# Patient Record
Sex: FEMALE
[De-identification: names, ages, dates, MRNs, and addresses within clinical notes are randomized per-mention and may not be internally consistent; named-entity substitution may affect disease eponyms.]

---

## 2018-12-21 ENCOUNTER — HOSPITAL ENCOUNTER (EMERGENCY)
Dept: HOSPITAL 80 - CED | Age: 23
Discharge: HOME | End: 2018-12-21
Payer: MEDICAID

## 2018-12-21 VITALS — DIASTOLIC BLOOD PRESSURE: 62 MMHG | SYSTOLIC BLOOD PRESSURE: 105 MMHG

## 2018-12-21 DIAGNOSIS — E86.0: ICD-10-CM

## 2018-12-21 DIAGNOSIS — A08.4: ICD-10-CM

## 2018-12-21 DIAGNOSIS — Z32.01: Primary | ICD-10-CM

## 2018-12-21 NOTE — EDPHY
H & P


Stated Complaint: PT. states vomiting start aprox 0700 this am with diarrhea,

chills


Time Seen by Provider: 12/21/18 11:16


HPI/ROS: 





This patient describes multiple episodes of vomiting and loose watery diarrhea 

that started around 7 continue toe shortly prior to arrival with ongoing 

nausea.  A co-worker drove her in for evaluation.  She reports some 

lightheadedness while standing prior to arrival here.  She reported bright red 

streaks of blood in her vomit the last time she vomited.  She notes no blood in 

her stool.  No dark tarry stools or coffee-ground emesis.  She reports 

antecedent nasal congestion for 3 days prior to the onset of her GI symptoms.  

She notes no exacerbating factors for her vomiting diarrhea and she reports 

associated generalized abdominal cramping that is 8/10 intensity again with no 

exacerbating factors.





ROS:  Constitutional:  No fevers.  No other constitutional symptoms


HEENT:  Nasal congestion without sinus pain.


Pulmonary:  She had cough that resolved over the past week or so.  No current 

coughing or shortness of breath.


Cardiovascular:  Orthostatics symptoms as described.  No chest pain.\


GI:  No abdominal distension.  No focal area of pain in her belly.


:  No dysuria frequency urgency.  Last menstrual period was 5 weeks ago which 

is not uncommon for her.


No vaginal bleeding.  No vaginal discharge.


Integumentary:  No rash


Neuro:  No complaints


10 point review of symptoms is performed and otherwise negative with exception 

of pertinent positives and negatives listed in HPI and ROS





Source: Patient


Exam Limitations: No limitations





- Personal History


LMP (Females 10-55): Extended Cycle BCP/Inj


Current Tetanus Diphtheria and Acellular Pertussis (TDAP): Yes


Tetanus Vaccine Date: 2015





- Medical/Surgical History


Hx Asthma: No


Hx Chronic Respiratory Disease: No


Hx Diabetes: No


Hx Cardiac Disease: No


Hx Renal Disease: No


Hx Cirrhosis: No


Hx Alcoholism: No


Hx HIV/AIDS: No


Hx Splenectomy or Spleen Trauma: No


Other PMH: MEd hx-none.  Surg-none





- Social History


Smoking Status: Never smoked


Alcohol Use: None


Drug Use: Marijuana (Occasional marijuana use)





- Physical Exam


Exam: 





General Appearance:  Pleasant  female Alert, no distress.


Eyes:  Pupils equal and round no pallor or injection.


ENT, Mouth:  Mucous membranes moist.


Respiratory:  There are no retractions, lungs are clear to auscultation.


Cardiovascular:  Regular rate and rhythm.  No murmur gallop or rub


Gastrointestinal:  Hypoactive bowel sounds with diffuse moderate tenderness 

without guarding or rebound.


Neurological:  GCS 15. 


Skin:  Warm and dry, no rashes.


Musculoskeletal:  Neck is supple nontender.


Extremities are symmetrical, full range of motion.


Psychiatric:  Mood and affect are normal





DIFFERENTIAL DIAGNOSIS:  After history and physical exam differential diagnosis 

was considered for viral gastroenteritis with associated cramping, dehydration 

couple appendicitis, UTI, ectopic pregnancy


Constitutional: 


 Initial Vital Signs











Temperature (C)  36.4 C   12/21/18 11:16


 


Heart Rate  85   12/21/18 11:16


 


Respiratory Rate  16   12/21/18 11:16


 


Blood Pressure  107/56 L  12/21/18 11:16


 


O2 Sat (%)  100   12/21/18 11:16








 











O2 Delivery Mode               Room Air














Allergies/Adverse Reactions: 


 





No Known Allergies Allergy (Verified 12/21/18 11:16)


 








Home Medications: 














 Medication  Instructions  Recorded


 


Ondansetron Odt [Zofran Odt] 4 - 8 mg PO Q4PRN PRN #4 tab 12/21/18














Medical Decision Making





- Diagnostics


Imaging Results: 


Her pelvic ultrasound results of her positive pregnancy urine dip test showed 

what appears to be an early gestational sac within the uterus per Radiology.  

She has no ovarian masses or other findings that would suggest ectopic 

pregnancy.  I discussed this with the radiologist


Imaging: Discussed imaging studies w/ On call Radiologist


ED Course/Re-evaluation: 





IV normal saline bolus





Benadryl and Reglan for nausea, vomiting and abdominal cramping





Discussion:  Patient presents with findings consistent with viral 

gastroenteritis-URI symptoms accompanied by vomiting and diarrhea.  Her last 

menstrual period was 5 weeks ago drinking pregnancy test is positive for early 

intrauterine pregnancy.  She is well-hydrated after 2 L normal saline and 

nausea control with Zofran.  I counseled her regarding her pregnancy which she 

wishes to keep.  Her significant other-boyfriend is also present at the bedside 

and they seem happy about the news of pregnancy.  She has no OBGYN physician 

also provided our gyn on-call for close follow-up.  She understands need to 

return emergency department should she have significant recurrence of symptoms 

despite plan of light diet and hydration.





- Data Points


Laboratory Results: 


CBC basic metabolic panel are normal.





Urinalysis is normal





Urine pregnancy is positive.


Medications Given: 


 








Discontinued Medications





Diphenhydramine HCl (Benadryl Injection)  25 mg IVP EDNOW ONE


   Stop: 12/21/18 11:30


   Last Admin: 12/21/18 11:39 Dose:  25 mg


Hyoscyamine Sulfate (Levsin, Hyomax-Sl)  0.125 mg PO EDNOW ONE


   Stop: 12/21/18 11:57


   Last Admin: 12/21/18 11:58 Dose:  0.125 mg


Hyoscyamine Sulfate (Levsin, Hyomax-Sl)  0.125 mg PO EDNOW ONE


   Stop: 12/21/18 13:30


   Last Admin: 12/21/18 13:40 Dose:  0.125 mg


Sodium Chloride (Ns)  1,000 mls @ 0 mls/hr IV ONCE ONE


   PRN Reason: Wide Open


   Stop: 12/21/18 11:30


   Last Admin: 12/21/18 11:38 Dose:  1,000 mls


Sodium Chloride (Ns)  1,000 mls @ 0 mls/hr IV ONCE ONE


   PRN Reason: Wide Open


   Stop: 12/21/18 12:21


   Last Admin: 12/21/18 12:24 Dose:  1,000 mls


Ketorolac Tromethamine (Toradol)  15 mg IVP EDNOW ONE


   Stop: 12/21/18 11:57


   Last Admin: 12/21/18 12:02 Dose:  15 mg


Metoclopramide HCl (Reglan Injection)  10 mg IVP EDNOW ONE


   Stop: 12/21/18 11:30


   Last Admin: 12/21/18 11:48 Dose:  10 mg


Ondansetron HCl (Zofran)  4 mg IVP EDNOW ONE


   Stop: 12/21/18 15:15


   Last Admin: 12/21/18 15:27 Dose:  4 mg





Point of Care Test Results: 


 CBC











CBC Collection Date            12/21/18


 


CBC Collection Time            11:30


 


WBC                            9.6


 


RBC                            4.78


 


HGB                            14.4


 


HCT                            41.4


 


PLT                            325


 


Neut #                         8.8


 


Neut                           91.8


 


LYMPH #                        0.5


 


LYMPH                          4.7


 


Other WBC #                    0.3


 


Other WBC                      3.5


 


MCV                            86.6











 Chemistry











  12/21/18 12/21/18





  11:59 11:37


 


POC Sodium  139 mEq/L mEq/L  140 mEq/L mEq/L





   (135-145)   (135-145) 


 


POC Potassium  3.7 mEq/L mEq/L  2.8 mEq/L L mEq/L





   (3.3-5.0)   (3.3-5.0) 


 


POC Chloride  105 mEq/L mEq/L  102.0 mEq/L mEq/L





   ()   () 


 


POC Total CO2    21 mEq/L L mEq/L





    (22-31) 


 


POC BUN  10 mg/dL mg/dL  10 mg/dL mg/dL





   (7-23)   (7-23) 


 


POC Creatinine  0.4 mg/dL L mg/dL  0.6 mg/dL mg/dL





   (0.6-1.0)   (0.6-1.0) 


 


POC Glucose  84 mg/dL mg/dL  87 mg/dL mg/dL





   ()   () 


 


POC Calcium    9.2 mg/dL mg/dL





    (8.5-10.4) 








 ISTAT H&H











  12/21/18





  11:59


 


POC Hgb  15.3 gm/dL gm/dL





   (12.6-16.3) 


 


POC Hct  45 % %





   (38-47) 








 Urine Pregnancy











Collection Date                12/21/18


 


Collection Time                13:25


 


HCG Results                    Positive











 Urine Dip











Collection Date                12/21/18


 


Collection Time                13:25


 


Specific Gravity (1.002-1.030) 1.000


 


PH (5.0-7.5)                   7.0


 


Leukocytes (Negative)          Trace


 


Nitrites (Negative)            Negative


 


Protein (Negative)             Negative


 


Glucose (Negative)             Negative


 


Ketones (Negative)             4+


 


Urobilnogen (0.2-1.0 EU)       1.0


 


Bilirubin (Negative)           Negative


 


Blood (Negative)               Negative

















Departure





- Departure


Disposition: Home, Routine, Self-Care


Clinical Impression: 


 Viral gastroenteritis, Generalized abdominal cramping, Dehydration, 

Intrauterine pregnancy





Condition: Good


Instructions:  Pregnancy (ED), Gastroenteritis (ED)


Additional Instructions: 


Diagnosis:  Viral gastroenteritis 2.  Abdominal cramping 3. Dehydration 4. 1st 

trimester intrauterine pregnancy





Plan:  Light diet until he feel improved-bananas, rice, applesauce, soup, toast 

and similar





Zofran-under the tongue if needed for nausea or vomiting





Return for any significant worsening despite treatment plan





Make appointment follow up with OBGYN physician





Follow-up with primary care physician for any ongoing symptoms that persist 

beyond the next few days.





Return emergency department for any significant worsening despite treatment plan


Referrals: 


Goodeill,Papito, DO [Doctor of Osteopathy] - As per Instructions


NONE *PRIMARY CARE P,. [Primary Care Provider] - As per Instructions


Teri Boyd MD [Medical Doctor] - As per Instructions


Prescriptions: 


Ondansetron Odt [Zofran Odt] 4 - 8 mg PO Q4PRN PRN #4 tab


 PRN Reason: Vomiting